# Patient Record
Sex: FEMALE | Race: OTHER | NOT HISPANIC OR LATINO
[De-identification: names, ages, dates, MRNs, and addresses within clinical notes are randomized per-mention and may not be internally consistent; named-entity substitution may affect disease eponyms.]

---

## 2022-03-09 PROBLEM — Z00.00 ENCOUNTER FOR PREVENTIVE HEALTH EXAMINATION: Status: ACTIVE | Noted: 2022-03-09

## 2022-03-14 ENCOUNTER — APPOINTMENT (OUTPATIENT)
Dept: OTOLARYNGOLOGY | Facility: CLINIC | Age: 72
End: 2022-03-14
Payer: MEDICARE

## 2022-03-14 VITALS — BODY MASS INDEX: 22.97 KG/M2 | HEIGHT: 60 IN | WEIGHT: 117 LBS

## 2022-03-14 DIAGNOSIS — E04.1 NONTOXIC SINGLE THYROID NODULE: ICD-10-CM

## 2022-03-14 DIAGNOSIS — H61.20 IMPACTED CERUMEN, UNSPECIFIED EAR: ICD-10-CM

## 2022-03-14 DIAGNOSIS — H61.23 IMPACTED CERUMEN, BILATERAL: ICD-10-CM

## 2022-03-14 PROCEDURE — 69210 REMOVE IMPACTED EAR WAX UNI: CPT

## 2022-03-14 PROCEDURE — 99205 OFFICE O/P NEW HI 60 MIN: CPT | Mod: 25

## 2022-03-14 PROCEDURE — 31575 DIAGNOSTIC LARYNGOSCOPY: CPT

## 2022-03-14 NOTE — CONSULT LETTER
[Dear  ___] : Dear  [unfilled], [Consult Letter:] : I had the pleasure of evaluating your patient, [unfilled]. [Please see my note below.] : Please see my note below. [Consult Closing:] : Thank you very much for allowing me to participate in the care of this patient.  If you have any questions, please do not hesitate to contact me. [Sincerely,] : Sincerely, [FreeTextEntry3] : Bandar Peña MD\par Director; [facility]\par Otolaryngology - Head and Neck Surgery\par [facility]\par \par \par Department of Otolaryngology\par [facility]\par \par [address]\par [facility], 10th Floor\[place], [place], [address]\par Office Tel: [phone]\par \par \par

## 2022-03-14 NOTE — PHYSICAL EXAM
[Midline] : trachea located in midline position [Normal] : no rashes [de-identified] : nodule on center of neck, smooth mobile, non tender [de-identified] : bilateral cerumen impaction, cleaned away with noted improvement

## 2022-03-14 NOTE — PROCEDURE
[FreeTextEntry3] : Cerumen Removal/Ear Cleaning for Otitis Externa\par Pre-operative Diagnosis: bilateral Cerumen Impaction\par Post-operative Diagnosis: Same\par Procedure: Binocular microscopy with cerumen removal- [id number]\par Procedure Details: \par The patient was placed in the supine position. The operating microscope was positioned. I then placed the ear speculum in the EAC. Cerumen was then removed using a mixture of otologic curettes, and suction. The TM was noted to be intact. I then performed the procedure of the opposite ear in similar fashion. The patient tolerated procedure well.\par \par Findings: \par Bilateral Ear Canal - normal\par Bilateral Tympanic Membrane - normal\par \par Recommendations: Debrox\par Complications: None\par \par  [de-identified] : Laryngoscopy: Flexible Laryngoscopy - Procedure Note\par \par Pre-operative Diagnosis: neck mass\par Post-operative Diagnosis: normal exam\par Anesthesia: Topical - 1 % Lidocaine/Phenylephrine\par Procedure: Flexible Laryngoscopy\par \par Procedure Details: \par The patient was placed in the sitting position. After decongestant and anesthesia were applied the laryngoscope was passed. The nasal cavities, nasopharynx, oropharynx, hypopharynx, and larynx were all examined. Vocal folds were examined during respiration and phonation. The following findings were noted:\par \par Findings: \par Nose: Septum is midline, turbinates are normal, nasal airways patent, mucosa normal\par Nasopharynx: Adenoids normal, no masses, eustachian tube normal\par Oropharynx: Pharyngeal walls symmetric and without lesion. Tonsils/fossae symmetric and normal.\par Hypopharynx: Hypopharynx and pyriform sinuses without lesion. No masses or asymmetry. No pooling of secretions.\par Larynx: Epiglottis and aryepiglottic folds were sharp and crisp bilaterally. Bilateral false and true vocal folds normal appearance. Bilateral vocal folds fully mobile and symmetric. Airway was widely patent. \par \par Condition: Stable. Patient tolerated procedure well.\par \par Complications: None\par \par

## 2022-03-14 NOTE — HISTORY OF PRESENT ILLNESS
[de-identified] : 71 year old female presents to the office today with complaints of painful swallowing 1 month ago. She noticed a lump on her throat when she swallowed. The pain has gotten better but the lump is still present and feels a burning sensation in that area. It is not getting bigger. Denies breathing issues or difficulty swallowing/chewing foods/liquids. She also has a globus sensation. She feels like her voice has become a little bit stronger. Denies any other voice changes. Her PCP got an US of her neck and referred her to us for a possible biopsy.  She denies any other ENT complaints.

## 2022-03-14 NOTE — ASSESSMENT
[FreeTextEntry1] : 71-year-old female who presents with concern for a thyroid nodule.  On exam the patient does have a midline neck mass that is consistent with a thyroid isthmus nodule.  I am awaiting the results of the previous ultrasound that she had completed.  Once we review we will plan for ultrasound-guided fine-needle aspiration biopsy of the thyroid isthmus nodule.  Patient then will follow up with me to review those results and discuss next steps.\par \par Today there was also evidence of bilateral cerumen impaction which was cleaned away.  The patient noted immediate improvement.  I am recommending Debrox otic drops for the next 2 to 3 days and then as needed after.\par \par - get US neck results \par - plan for US FNA for thyroid nodule\par - follow up after \par - Debrox as needed\par

## 2022-03-14 NOTE — REASON FOR VISIT
[Initial Consultation] : an initial consultation for [FreeTextEntry2] : referred by Dr. Nicholson for thyroid nodule

## 2022-04-11 ENCOUNTER — APPOINTMENT (OUTPATIENT)
Dept: OTOLARYNGOLOGY | Facility: CLINIC | Age: 72
End: 2022-04-11